# Patient Record
Sex: MALE | Race: ASIAN | NOT HISPANIC OR LATINO | Employment: FULL TIME | URBAN - METROPOLITAN AREA
[De-identification: names, ages, dates, MRNs, and addresses within clinical notes are randomized per-mention and may not be internally consistent; named-entity substitution may affect disease eponyms.]

---
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
## 2024-11-06 ENCOUNTER — OFFICE VISIT (OUTPATIENT)
Dept: URGENT CARE | Facility: URGENT CARE | Age: 19
End: 2024-11-06
Payer: COMMERCIAL

## 2024-11-06 VITALS
RESPIRATION RATE: 20 BRPM | OXYGEN SATURATION: 98 % | TEMPERATURE: 98 F | WEIGHT: 148 LBS | SYSTOLIC BLOOD PRESSURE: 134 MMHG | DIASTOLIC BLOOD PRESSURE: 87 MMHG | BODY MASS INDEX: 22.96 KG/M2 | HEART RATE: 76 BPM

## 2024-11-06 DIAGNOSIS — H60.502 ACUTE OTITIS EXTERNA OF LEFT EAR, UNSPECIFIED TYPE: Primary | ICD-10-CM

## 2024-11-06 PROCEDURE — 99203 OFFICE O/P NEW LOW 30 MIN: CPT | Performed by: PHYSICIAN ASSISTANT

## 2024-11-06 RX ORDER — OFLOXACIN 3 MG/ML
5 SOLUTION AURICULAR (OTIC) DAILY
Qty: 10 ML | Refills: 0 | Status: SHIPPED | OUTPATIENT
Start: 2024-11-06 | End: 2024-11-11

## 2024-11-06 NOTE — PROGRESS NOTES
Patient presents with:  Ear Problem: Left ear started hurting a few days ago, only while laying down, denies drainage or hearing disturbances.       Clinical Decision Making:  Patient is treated for acute otitis externa of the left ear with ofloxacin ottic drops.  Over-the-counter Tylenol for comfort. Follow up with primary care provider if you do not get resolution with the course of treatment.  Return to walk-in care if complication or new symptoms arise in the interim.        ICD-10-CM    1. Acute otitis externa of left ear, unspecified type  H60.502 ofloxacin (FLOXIN) 0.3 % otic solution          Patient Instructions     Use the topical eardrops to the left ear  Use of over-the-counter Tylenol for comfort.  Follow packaging directions    Follow-up with primary care provider if not getting good resolution or if new symptoms or concerns arise        HPI:  Dameon Muñoz is a 19 year old male who presents today for evaluation of left ear pain that started hurting 2 days ago.  Patient has not had otorrhea or hearing deficits.  No involvement of the right ear at this time.  He works as a  but does not use an ear canal protective equipment and uses over the ear hearing protection.    History obtained from chart review and the patient.    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    Vitals:    11/06/24 1045   BP: 134/87   BP Location: Right arm   Patient Position: Sitting   Cuff Size: Adult Regular   Pulse: 76   Resp: 20   Temp: 98  F (36.7  C)   TempSrc: Oral   SpO2: 98%   Weight: 67.1 kg (148 lb)       General: Patient is resting comfortably no acute distress is afebrile  HEENT: Head is normocephalic atraumatic   eyes are PERRL EOMI sclera anicteric   TMs are clear bilaterally  Left posterior lateral canal has erythema and there is tenderness to palpation with the speculum movement of the external pinna and tragus also irritates the left external auditory canal.  Right external auditory canal is clear.  Throat is clear and no exudate  No cervical lymphadenopathy present  LUNGS: Clear to auscultation bilaterally  HEART: Regular rate and rhythm  Skin: Without rash non-diaphoretic    Physical Exam    At the end of the encounter, I discussed results, diagnosis, medications. Discussed red flags for immediate return to clinic/ER, as well as indications for follow up if no improvement. Patient understood and agreed to plan. Patient was stable for discharge.

## 2024-11-06 NOTE — PATIENT INSTRUCTIONS
Use the topical eardrops to the left ear  Use of over-the-counter Tylenol for comfort.  Follow packaging directions    Follow-up with primary care provider if not getting good resolution or if new symptoms or concerns arise